# Patient Record
Sex: MALE | Race: WHITE | NOT HISPANIC OR LATINO | Employment: UNEMPLOYED | URBAN - METROPOLITAN AREA
[De-identification: names, ages, dates, MRNs, and addresses within clinical notes are randomized per-mention and may not be internally consistent; named-entity substitution may affect disease eponyms.]

---
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
## 2020-12-02 DIAGNOSIS — F90.9 ATTENTION DEFICIT HYPERACTIVITY DISORDER (ADHD), UNSPECIFIED ADHD TYPE: ICD-10-CM

## 2021-04-16 ENCOUNTER — BULK ORDERING (OUTPATIENT)
Dept: CASE MANAGEMENT | Facility: OTHER | Age: 21
End: 2021-04-16

## 2021-04-16 DIAGNOSIS — Z23 IMMUNIZATION DUE: ICD-10-CM

## 2023-02-23 ENCOUNTER — OFFICE VISIT (OUTPATIENT)
Dept: FAMILY MEDICINE CLINIC | Facility: CLINIC | Age: 23
End: 2023-02-23
Payer: OTHER GOVERNMENT

## 2023-02-23 VITALS
DIASTOLIC BLOOD PRESSURE: 74 MMHG | SYSTOLIC BLOOD PRESSURE: 130 MMHG | BODY MASS INDEX: 27.46 KG/M2 | HEIGHT: 74 IN | RESPIRATION RATE: 18 BRPM | WEIGHT: 214 LBS | TEMPERATURE: 98.4 F | HEART RATE: 100 BPM | OXYGEN SATURATION: 97 %

## 2023-02-23 DIAGNOSIS — Z13.228 SCREENING FOR METABOLIC DISORDER: ICD-10-CM

## 2023-02-23 DIAGNOSIS — R00.0 TACHYCARDIA: ICD-10-CM

## 2023-02-23 DIAGNOSIS — Z13.220 SCREENING FOR LIPID DISORDERS: ICD-10-CM

## 2023-02-23 DIAGNOSIS — R82.998 FROTHY URINE: ICD-10-CM

## 2023-02-23 DIAGNOSIS — R22.2 MASS OF CHEST WALL, RIGHT: ICD-10-CM

## 2023-02-23 DIAGNOSIS — F90.0 ATTENTION DEFICIT HYPERACTIVITY DISORDER (ADHD), PREDOMINANTLY INATTENTIVE TYPE: ICD-10-CM

## 2023-02-23 DIAGNOSIS — Z11.59 ENCOUNTER FOR HEPATITIS C SCREENING TEST FOR LOW RISK PATIENT: ICD-10-CM

## 2023-02-23 DIAGNOSIS — Z00.00 ANNUAL PHYSICAL EXAM: Primary | ICD-10-CM

## 2023-02-23 DIAGNOSIS — R22.2 MASS OF CHEST WALL, LEFT: ICD-10-CM

## 2023-02-23 DIAGNOSIS — R00.2 PALPITATIONS: ICD-10-CM

## 2023-02-23 PROBLEM — F90.2 ATTENTION DEFICIT HYPERACTIVITY DISORDER (ADHD), COMBINED TYPE: Status: ACTIVE | Noted: 2023-02-23

## 2023-02-23 PROBLEM — F90.2 ATTENTION DEFICIT HYPERACTIVITY DISORDER (ADHD), COMBINED TYPE: Status: RESOLVED | Noted: 2023-02-23 | Resolved: 2023-02-23

## 2023-02-23 PROCEDURE — 99385 PREV VISIT NEW AGE 18-39: CPT

## 2023-02-23 PROCEDURE — 93000 ELECTROCARDIOGRAM COMPLETE: CPT

## 2023-02-23 PROCEDURE — 99214 OFFICE O/P EST MOD 30 MIN: CPT

## 2023-02-23 NOTE — PROGRESS NOTES
"Chief Complaint  Annual Exam (Has 2 lumps on chest/abd area)    Subjective        Chaparro Morrison presents to [facility]  History of Present Illness  Patient is a 23-year-old male, new patient to me and here to establish care. Patient here today for annual physical with routine labs. Patient is fasting today. Reports a mixed diet, likes chips, rarely drinks sodas. Reports that he does not exercise regularly. Patient is not currently working. Does not go to eye doctor annually. Does have dental insurance- does not go to dentist every 6 months, Not up to date.   Patient is a non-smoker.  Reports childhood asthma that has now resolved.   Patient denies family history of prostate cancer, colon cancer, or diabetes.  Patient has never had an upper or lower scope.    Patient reports he found a unknown mass on left ribs that ne noticed about 1 week ago. Patient also has a small mass on right side of mid chest he noticed about 1 month ago. Denies new onset SOA or CP.  Patient reports he does feel like he has had palpitations over the last week since he is no disease mass and feels as though his anxiety is increased.  Patient's heart rate is 100 in office today and tachycardic upon physical exam.  Patient follows Dr. Simmons with psych in [place].  Patient is prescribed Vyvanse 70mg daily for ADHD and has been taking this medication since he was 16 years old. Patient follows up every 3 months. Patient reports he has been told he had a high HR in the past.   Patient reports frothy urine that he has had since childhood.  Patient denies dysuria, hematuria, urinary hesitancy or urinary frequency.    Objective   Vital Signs:  /74 (BP Location: Left arm, Patient Position: Sitting, Cuff Size: Adult)   Pulse 100   Temp 98.4 °F (36.9 °C) (Infrared)   Resp 18   Ht 188 cm (74\")   Wt 97.1 kg (214 lb)   SpO2 97%   BMI 27.48 kg/m²   Estimated body mass index is 27.48 kg/m² as calculated from the " "following:    Height as of this encounter: 188 cm (74\").    Weight as of this encounter: 97.1 kg (214 lb).             Physical Exam  Constitutional:       General: He is awake.      Appearance: Normal appearance.   HENT:      Head: Normocephalic and atraumatic.      Nose: Nose normal.   Eyes:      Extraocular Movements: Extraocular movements intact.      Conjunctiva/sclera: Conjunctivae normal.      Pupils: Pupils are equal, round, and reactive to light.   Cardiovascular:      Rate and Rhythm: Regular rhythm. Tachycardia present.      Pulses: Normal pulses.      Heart sounds: Normal heart sounds.   Pulmonary:      Effort: Pulmonary effort is normal.      Breath sounds: Normal breath sounds and air entry. No decreased breath sounds, wheezing, rhonchi or rales.   Chest:       Skin:     General: Skin is warm and dry.   Neurological:      General: No focal deficit present.      Mental Status: He is alert and oriented to person, place, and time. Mental status is at baseline.   Psychiatric:         Attention and Perception: Attention normal.         Behavior: Behavior normal. Behavior is cooperative.        Result Review :              ECG 12 Lead    Date/Time: 2/23/2023 11:05 AM  Performed by: Karen Vera APRN  Authorized by: Karen Vera APRN   Comparison: not compared with previous ECG   Previous ECG: no previous ECG available  Rhythm: sinus arrhythmia  Rate: normal  Conduction: non-specific intraventricular conduction delay    Clinical impression: abnormal EKG  Comments: Follow up with holter monitor              Assessment and Plan   Diagnoses and all orders for this visit:    1. Annual physical exam (Primary)  -     Urinalysis With Microscopic - Urine, Clean Catch  -     CBC & Differential  -     Comprehensive Metabolic Panel  -     HCV Antibody Rfx To Qnt PCR  -     Lipid Panel  -     TSH Rfx On Abnormal To Free T4  -     ECG 12 Lead    2. Screening for lipid disorders  -     CBC & Differential  -     " Comprehensive Metabolic Panel  -     Lipid Panel    3. Screening for metabolic disorder  -     CBC & Differential  -     Comprehensive Metabolic Panel  -     Lipid Panel  -     TSH Rfx On Abnormal To Free T4    4. Encounter for hepatitis C screening test for low risk patient  -     HCV Antibody Rfx To Qnt PCR    5. Mass of chest wall, left  -     XR Chest PA & Lateral; Future    6. Mass of chest wall, right  -     XR Chest PA & Lateral; Future    7. Attention deficit hyperactivity disorder (ADHD), predominantly inattentive type  -     Holter Monitor - 72 Hour Up To 15 Days; Future  -     ECG 12 Lead    8. Tachycardia  -     Holter Monitor - 72 Hour Up To 15 Days; Future  -     ECG 12 Lead    9. Palpitations  -     Holter Monitor - 72 Hour Up To 15 Days; Future  -     ECG 12 Lead    10. Frothy urine  -     Urinalysis With Microscopic - Urine, Clean Catch    [facility] to [facility].   • [address] [place] [address]  Enter at Entrance A and go straight to the Radiology Department. The order is in the computer system. You do not need an appointment and you can go at any time.    Go to the eye doctor biannually and get your teeth cleaned every 6 months.    Follow-up with your psychiatrist about having a fast heart rate related to Vyvanse.  Discuss nonstimulant options for ADHD and behavioral therapy.  I have ordered heart rate monitor for you to wear for 5 days.  Our office will be in touch with you about getting this scheduled and placed on you.    Our office will call you or you will see a note on your [facility] when your in-office blood work results.    Counseling was provided on nutrition, physical activity, development, and preventative health. Patient verbalizes understanding no additional questions were asked. Patient agrees with plan of care and understands instructions. Call if any problems or concerns.          Follow Up   Return if symptoms worsen or fail to  improve.  Patient was given instructions and counseling regarding his condition or for health maintenance advice. Please see specific information pulled into the AVS if appropriate.

## 2023-02-23 NOTE — PATIENT INSTRUCTIONS
Go to [facility] for Xray.   [address] [place] [address]  Enter at Entrance A and go straight to the Radiology Department. The order is in the computer system. You do not need an appointment and you can go at any time.    Go to the eye doctor biannually and get your teeth cleaned every 6 months.    Follow-up with your psychiatrist about having a fast heart rate related to Vyvanse.  Discuss nonstimulant options for ADHD and behavioral therapy.  I have ordered heart rate monitor for you to wear for 5 days.  Our office will be in touch with you about getting this scheduled and placed on you.    Our office will call you or you will see a note on your [facility] when your in-office blood work results.    Counseling was provided on nutrition, physical activity, development, and preventative health. Patient verbalizes understanding no additional questions were asked. Patient agrees with plan of care and understands instructions. Call if any problems or concerns.

## 2023-02-24 LAB
ALBUMIN SERPL-MCNC: 4.7 G/DL (ref 4.1–5.2)
ALBUMIN/GLOB SERPL: 2 {RATIO} (ref 1.2–2.2)
ALP SERPL-CCNC: 40 IU/L (ref 44–121)
ALT SERPL-CCNC: 9 IU/L (ref 0–44)
APPEARANCE UR: CLEAR
AST SERPL-CCNC: 12 IU/L (ref 0–40)
BACTERIA #/AREA URNS HPF: NORMAL /[HPF]
BASOPHILS # BLD AUTO: 0.1 X10E3/UL (ref 0–0.2)
BASOPHILS NFR BLD AUTO: 1 %
BILIRUB SERPL-MCNC: 0.6 MG/DL (ref 0–1.2)
BILIRUB UR QL STRIP: NEGATIVE
BUN SERPL-MCNC: 11 MG/DL (ref 6–20)
BUN/CREAT SERPL: 17 (ref 9–20)
CALCIUM SERPL-MCNC: 9.4 MG/DL (ref 8.7–10.2)
CASTS URNS QL MICRO: NORMAL /LPF
CHLORIDE SERPL-SCNC: 102 MMOL/L (ref 96–106)
CHOLEST SERPL-MCNC: 114 MG/DL (ref 100–199)
CO2 SERPL-SCNC: 25 MMOL/L (ref 20–29)
COLOR UR: YELLOW
CREAT SERPL-MCNC: 0.66 MG/DL (ref 0.76–1.27)
EGFRCR SERPLBLD CKD-EPI 2021: 135 ML/MIN/1.73
EOSINOPHIL # BLD AUTO: 0 X10E3/UL (ref 0–0.4)
EOSINOPHIL NFR BLD AUTO: 1 %
EPI CELLS #/AREA URNS HPF: NORMAL /HPF (ref 0–10)
ERYTHROCYTE [DISTWIDTH] IN BLOOD BY AUTOMATED COUNT: 12.5 % (ref 11.6–15.4)
GLOBULIN SER CALC-MCNC: 2.3 G/DL (ref 1.5–4.5)
GLUCOSE SERPL-MCNC: 87 MG/DL (ref 70–99)
GLUCOSE UR QL STRIP: NEGATIVE
HCT VFR BLD AUTO: 44.4 % (ref 37.5–51)
HCV AB SERPL QL IA: NORMAL
HCV IGG SERPL QL IA: NON REACTIVE
HDLC SERPL-MCNC: 73 MG/DL
HGB BLD-MCNC: 14.8 G/DL (ref 13–17.7)
HGB UR QL STRIP: NEGATIVE
IMM GRANULOCYTES # BLD AUTO: 0 X10E3/UL (ref 0–0.1)
IMM GRANULOCYTES NFR BLD AUTO: 0 %
KETONES UR QL STRIP: NEGATIVE
LDLC SERPL CALC-MCNC: 30 MG/DL (ref 0–99)
LEUKOCYTE ESTERASE UR QL STRIP: NEGATIVE
LYMPHOCYTES # BLD AUTO: 1.6 X10E3/UL (ref 0.7–3.1)
LYMPHOCYTES NFR BLD AUTO: 32 %
MCH RBC QN AUTO: 27.2 PG (ref 26.6–33)
MCHC RBC AUTO-ENTMCNC: 33.3 G/DL (ref 31.5–35.7)
MCV RBC AUTO: 82 FL (ref 79–97)
MICRO URNS: ABNORMAL
MICRO URNS: ABNORMAL
MONOCYTES # BLD AUTO: 0.3 X10E3/UL (ref 0.1–0.9)
MONOCYTES NFR BLD AUTO: 7 %
NEUTROPHILS # BLD AUTO: 2.8 X10E3/UL (ref 1.4–7)
NEUTROPHILS NFR BLD AUTO: 59 %
NITRITE UR QL STRIP: NEGATIVE
PH UR STRIP: 6.5 [PH] (ref 5–7.5)
PLATELET # BLD AUTO: 271 X10E3/UL (ref 150–450)
POTASSIUM SERPL-SCNC: 4 MMOL/L (ref 3.5–5.2)
PROT SERPL-MCNC: 7 G/DL (ref 6–8.5)
PROT UR QL STRIP: ABNORMAL
RBC # BLD AUTO: 5.45 X10E6/UL (ref 4.14–5.8)
RBC #/AREA URNS HPF: NORMAL /HPF (ref 0–2)
SODIUM SERPL-SCNC: 140 MMOL/L (ref 134–144)
SP GR UR STRIP: >=1.03 (ref 1–1.03)
TRIGL SERPL-MCNC: 40 MG/DL (ref 0–149)
TSH SERPL DL<=0.005 MIU/L-ACNC: 1.55 UIU/ML (ref 0.45–4.5)
UROBILINOGEN UR STRIP-MCNC: 1 MG/DL (ref 0.2–1)
VLDLC SERPL CALC-MCNC: 11 MG/DL (ref 5–40)
WBC # BLD AUTO: 4.8 X10E3/UL (ref 3.4–10.8)
WBC #/AREA URNS HPF: NORMAL /HPF (ref 0–5)

## 2023-03-20 ENCOUNTER — HOSPITAL ENCOUNTER (OUTPATIENT)
Dept: CARDIOLOGY | Facility: HOSPITAL | Age: 23
Discharge: HOME | End: 2023-03-20
Payer: OTHER GOVERNMENT

## 2023-03-20 DIAGNOSIS — R00.2 PALPITATIONS: ICD-10-CM

## 2023-03-20 DIAGNOSIS — R00.0 TACHYCARDIA: ICD-10-CM

## 2023-03-20 DIAGNOSIS — F90.0 ATTENTION DEFICIT HYPERACTIVITY DISORDER (ADHD), PREDOMINANTLY INATTENTIVE TYPE: ICD-10-CM

## 2023-03-20 PROCEDURE — 93242 EXT ECG>48HR<7D RECORDING: CPT

## 2023-03-20 PROCEDURE — 93246 EXT ECG>7D<15D RECORDING: CPT

## 2023-04-05 LAB
MAXIMAL PREDICTED HEART RATE: 197 BPM
STRESS TARGET HR: 167 BPM